# Patient Record
Sex: MALE | Race: WHITE | URBAN - METROPOLITAN AREA
[De-identification: names, ages, dates, MRNs, and addresses within clinical notes are randomized per-mention and may not be internally consistent; named-entity substitution may affect disease eponyms.]

---

## 2017-10-27 ENCOUNTER — OFFICE VISIT (OUTPATIENT)
Dept: OPHTHALMOLOGY | Facility: CLINIC | Age: 37
End: 2017-10-27

## 2017-10-27 DIAGNOSIS — H16.229 KERATITIS SICCA: ICD-10-CM

## 2017-10-27 DIAGNOSIS — H52.223 REGULAR ASTIGMATISM, BILATERAL: Primary | ICD-10-CM

## 2017-10-27 DIAGNOSIS — H16.212 EXPOSURE KERATOCONJUNCTIVITIS, LEFT: ICD-10-CM

## 2017-10-27 ASSESSMENT — REFRACTION_MANIFEST
OS_SPHERE: +1.50
OD_SPHERE: -1.75
OS_AXIS: 005
OD_CYLINDER: +1.50
OS_CYLINDER: +0.50
OD_AXIS: 080

## 2017-10-27 ASSESSMENT — REFRACTION_WEARINGRX
OS_SPHERE: +2.00
OD_SPHERE: -1.75
SPECS_TYPE: SVL
OD_CYLINDER: +1.50
OS_CYLINDER: SPHERE
OD_AXIS: 087

## 2017-10-27 ASSESSMENT — CUP TO DISC RATIO
OS_RATIO: 0.3
OD_RATIO: 0.3

## 2017-10-27 ASSESSMENT — VISUAL ACUITY
CORRECTION_TYPE: GLASSES
OS_CC+: -1
METHOD: SNELLEN - LINEAR
OS_CC: 20/125
OD_CC: 20/10-3

## 2017-10-27 ASSESSMENT — PACHYMETRY: OD_CT(UM): 631

## 2017-10-27 ASSESSMENT — EXTERNAL EXAM - RIGHT EYE: OD_EXAM: NORMAL

## 2017-10-27 ASSESSMENT — TONOMETRY
IOP_METHOD: APPLANATION
OD_IOP_MMHG: 18
OD_IOP_MMHG: 25
IOP_METHOD: ICARE

## 2017-10-27 ASSESSMENT — EXTERNAL EXAM - LEFT EYE: OS_EXAM: NORMAL

## 2017-10-27 ASSESSMENT — SLIT LAMP EXAM - LIDS
COMMENTS: NORMAL
COMMENTS: NORMAL

## 2017-10-27 NOTE — MR AVS SNAPSHOT
After Visit Summary   10/27/2017    Giuseppe Bach    MRN: [id number]           Patient Information     Date Of Birth          1980        Visit Information        Provider Department      10/27/2017 1:20 PM Fabian Wu MD [facility]        Today's Diagnoses     Regular astigmatism, bilateral - Both Eyes    -  1    Exposure keratoconjunctivitis, left        Keratitis sicca - Left Eye           Follow-ups after your visit        Follow-up notes from your care team     Return in about 1 year (around 10/27/2018) for Complete Eye Exam.      Who to contact     Please call your clinic at [phone] to:    Ask questions about your health    Make or cancel appointments    Discuss your medicines    Learn about your test results    Speak to your doctor   If you have compliments or concerns about an experience at your clinic, or if you wish to file a complaint, please contact [facility] Patient Relations at [phone] or email us at [email or web address]         Additional Information About Your Visit        MyChart Information     [employer] is an electronic gateway that provides easy, online access to your medical records. With [employer], you can request a clinic appointment, read your test results, renew a prescription or communicate with your care team.     To sign up for [employer] visit the website at [email or web address]   You will be asked to enter the access code listed below, as well as some personal information. Please follow the directions to create your username and password.     Your access code is: [email or web address]  Expires: 2018  6:30 AM     Your access code will  in 90 days. If you need help or a new code, please contact your [facility] or call [phone] for assistance.        Care EveryWhere ID     This is your Care EveryWhere ID. This could be used by other  organizations to access your [facility] medical records  [id number]         Blood Pressure from Last 3 Encounters:   No data found for BP    Weight from Last 3 Encounters:   No data found for Wt              Today, you had the following     No orders found for display       Primary Care Provider    None Specified       No primary provider on file.        Equal Access to Services     MARCANAI TREVINOBRANDY : Hadii aad ku hadpricillao Soomaali, waaxda luqadaha, qaybta kaalmada adeegyada, waxmargaret goddard angelitageremias chua ede la'zakgeremias . So [facility] [phone].    ATENCIÓN: Si habla español, tiene a puente disposición servicios gratuitos de asistencia lingüística. Llame al [phone].    We comply with applicable federal civil rights laws and [place] laws. We do not discriminate on the basis of race, color, national origin, age, disability, sex, sexual orientation, or gender identity.            Thank you!     Thank you for choosing [facility]  for your care. Our goal is always to provide you with excellent care. Hearing back from our patients is one way we can continue to improve our services. Please take a few minutes to complete the written survey that you may receive in the mail after your visit with us. Thank you!             Your Updated Medication List - Protect others around you: Learn how to safely use, store and throw away your medicines at www.disposemymeds.org.          This list is accurate as of: 10/27/17  9:41 PM.  Always use your most recent med list.                   Brand Name Dispense Instructions for use Diagnosis    hypromellose-dextran 0.3-0.1% opthalmic solution      Place 1 drop into both eyes once a week Uses as needed maybe 1-2x/week

## 2017-10-28 NOTE — PROGRESS NOTES
Assessment & Plan      Giuseppe Bach is a 37 year old male with the following diagnoses:   (H52.223) Regular astigmatism, bilateral - Both Eyes  (primary encounter diagnosis)  Comment: Small change  Plan: Rx    (H16.212) Exposure keratoconjunctivitis, left  Comment: Moderately severe  Plan: Lubrication    (H16.9) Keratitis sicca - Left Eye  Comment: Also some allergic signs  Plan:  As above plus Zaditor as needed for allergy SX       -----------------------------------------------------------------------------------      Patient disposition:   Return in about 1 year (around 10/27/2018) for Complete Eye Exam. or sooner as needed.    Complete documentation of historical and exam elements from today's encounter can  be found in the full encounter summary report (not reduplicated in this progress  note). I personally obtained the chief complaint(s) and history of present illness. I  confirmed and edited as necessary the review of systems, past medical/surgical  history, family history, social history, and examination findings as documented by  others; and I examined the patient myself. I personally reviewed the relevant tests,  images, and reports as documented above. I formulated and edited as necessary the  assessment and plan and discussed the findings and management plan with the  patient and family.    JUNIOR Wu M.D

## 2018-01-09 ENCOUNTER — TELEPHONE (OUTPATIENT)
Dept: OPHTHALMOLOGY | Facility: CLINIC | Age: 38
End: 2018-01-09

## 2019-05-21 ENCOUNTER — TELEPHONE (OUTPATIENT)
Dept: OPHTHALMOLOGY | Facility: CLINIC | Age: 39
End: 2019-05-21

## 2019-05-21 ASSESSMENT — TONOMETRY: OS_IOP_MMHG: 26

## 2019-05-21 NOTE — TELEPHONE ENCOUNTER
[facility] Call Center    Phone Message    May a detailed message be left on voicemail: yes    Reason for Call: Other: Pt of Dr Wu has broken his glasses.  Pt has local optometrist who has Rx but says it is  and they will not fill the Rx with a phone call from us to allow them to extend the Rx.  Pt is having a hard time getting around without glasses and requests that we please call to okay it so he can pick them up later today.  Please contact [employer] [phone] to give them the ok     Action Taken: Message routed to:  [facility] clinic